# Patient Record
Sex: FEMALE | Race: WHITE | NOT HISPANIC OR LATINO | Employment: UNEMPLOYED | URBAN - METROPOLITAN AREA
[De-identification: names, ages, dates, MRNs, and addresses within clinical notes are randomized per-mention and may not be internally consistent; named-entity substitution may affect disease eponyms.]

---

## 2017-01-05 ENCOUNTER — GENERIC CONVERSION - ENCOUNTER (OUTPATIENT)
Dept: OTHER | Facility: OTHER | Age: 67
End: 2017-01-05

## 2017-02-06 ENCOUNTER — GENERIC CONVERSION - ENCOUNTER (OUTPATIENT)
Dept: OTHER | Facility: OTHER | Age: 67
End: 2017-02-06

## 2017-03-01 ENCOUNTER — ALLSCRIPTS OFFICE VISIT (OUTPATIENT)
Dept: OTHER | Facility: OTHER | Age: 67
End: 2017-03-01

## 2017-06-07 ENCOUNTER — GENERIC CONVERSION - ENCOUNTER (OUTPATIENT)
Dept: OTHER | Facility: OTHER | Age: 67
End: 2017-06-07

## 2017-06-08 ENCOUNTER — GENERIC CONVERSION - ENCOUNTER (OUTPATIENT)
Dept: OTHER | Facility: OTHER | Age: 67
End: 2017-06-08

## 2017-06-20 ENCOUNTER — GENERIC CONVERSION - ENCOUNTER (OUTPATIENT)
Dept: OTHER | Facility: OTHER | Age: 67
End: 2017-06-20

## 2018-01-12 NOTE — PROGRESS NOTES
Chief Complaint  Patient came in today for a blood pressure check  148/82      Active Problems    1  Abnormal weight loss (783 21) (R63 4)   2  Atrial fibrillation (427 31) (I48 91)   3  Biceps tendonitis on left (726 12) (M75 22)   4  Hematuria, microscopic (599 72) (R31 29)   5  Hyperlipidemia (272 4) (E78 5)   6  Hypertension (401 9) (I10)   7  Irritable bowel syndrome (564 1) (K58 9)   8  Medicare annual wellness visit, subsequent (V70 0) (Z00 00)   9  Osteopenia (733 90) (M85 80)   10  Osteoporosis (733 00) (M81 0)   11  Ostium secundum type atrial septal defect (745 5) (Q21 1)   12  Postmenopausal status (V49 81) (Z78 0)   13  Tremor (781 0) (R25 1)   14  Visit for pelvic exam (V72 31) (Z01 419)    Current Meds   1  Biotin 5 MG Oral Tablet; Take 1 tablet daily; Therapy: (Recorded:20Oct2016) to Recorded   2  Calcium TABS; Take 1 tablet daily; Therapy: (Susie Banco) to Recorded   3  CoQ10 CAPS; TAKE 1 CAPSULE DAILY WITH A MEAL; Therapy: (Susie Banco) to Recorded   4  Eliquis 5 MG Oral Tablet; Take 1 tablet twice daily; Therapy: 94FEU5355 to Recorded   5  Fish Oil CAPS; take 1 capsule daily; Therapy: (Recorded:20Oct2016) to Recorded   6  Multi-Vitamin Daily Oral Tablet; TAKE 1 TABLET DAILY; Therapy: (Susie Banco) to Recorded   7  Vitamin D 1000 UNIT Oral Tablet; TAKE 1 TABLET DAILY; Therapy: [id number] to Recorded    Allergies    1  Lisinopril TABS    2  Dust   3   No Known Food Allergies    Signatures   Electronically signed by : MAGDIEL Rod ; Nov 30 2016 12:50PM EST                       (Author)

## 2018-01-14 VITALS
WEIGHT: 122.19 LBS | BODY MASS INDEX: 22.48 KG/M2 | HEART RATE: 71 BPM | DIASTOLIC BLOOD PRESSURE: 70 MMHG | HEIGHT: 62 IN | SYSTOLIC BLOOD PRESSURE: 134 MMHG

## 2018-01-14 NOTE — PROGRESS NOTES
Assessment    1  Acute sinusitis (461 9) (J01 90)    Plan  Acute sinusitis    · Amoxicillin 500 MG Oral Capsule; TAKE 1 CAPSULE TWICE DAILY UNTIL GONE    Discussion/Summary    Finesse Tejada was seen and examined in the office today  We discussed that her symptoms may be secondary to an acute sinusitis, though not typical for bacterial sinusitis  She reports this is typical for her symptoms  She was given prescribed Amoxicillin to begin  We discussed that if symptoms persist, other conditions including silent reflux should be considered  The patient was counseled regarding instructions for management  The treatment plan was reviewed with the patient/guardian  The patient/guardian understands and agrees with the treatment plan      Chief Complaint  Burning in throat, sinus pressure      History of Present Illness  HPI: Finesse Tejada comes to the office today for a sick visit  She reports first noticing symptoms first at the sinus symptoms at the end of the December and seemed to persist  Symptoms include a drainage, thick which she coughs out, sinus pressure, without headache, fever, night sweats, or other related complaints  She reports noticing a burning in the back of her throat without any dysphagia  No other related complaints  Review of Systems    Constitutional: no fever, not feeling poorly, no chills and not feeling tired  ENT: sore throat and nasal discharge, but no earache, no nosebleeds and no hoarseness  Cardiovascular: no chest pain  Respiratory: no cough and no shortness of breath during exertion  Gastrointestinal: no abdominal pain, no nausea, no vomiting, no constipation and no diarrhea  ROS reviewed  Active Problems    1  Abnormal weight loss (783 21) (R63 4)   2  Atrial fibrillation (427 31) (I48 91)   3  Encounter for screening mammogram for malignant neoplasm of breast (V76 12)   (Z12 31)   4  Flu vaccine need (V04 81) (Z23)   5  History of self breast exam   6   Hypertension (401 9) (I10)   7  Left knee pain (719 46) (M25 562)   8  Osteopenia (733 90) (M85 80)   9  Osteoporosis (733 00) (M81 0)   10  Postmenopausal status (V49 81) (Z78 0)   11  Right knee pain (719 46) (M25 561)   12  Tremor (781 0) (R25 1)   13  Visit for pelvic exam (V72 31) (Z01 419)    Social History    · Denied: History of Alcohol Use (History)   · Caffeine Use   · Denied: History of Drug Use   · Former smoker (V15 82) (H67 491)   · Marital History - Currently    · [address] ([address]   · Three children   · Uses Safety Equipment - Seatbelts    Current Meds   1  Biotin TABS; Therapy: (Recorded:13Nov2014) to Recorded   2  Calcium TABS; Therapy: (0472 51 11 42) to Recorded   3  CoQ10 CAPS; Therapy: (Recorded:13Nov2014) to Recorded   4  Diltiazem HCl ER Coated Beads 120 MG Oral Capsule Extended Release 24 Hour; take   1 capsule once daily; Therapy: 32PXK8676 to (Gustavo Mayes)  Requested for: 38PHK3339; Last   Rx:05Jun2015 Ordered   5  Eliquis TABS; Therapy: 23WRM9663 to Recorded   6  Fish Oil CAPS; Therapy: (Recorded:13Nov2014) to Recorded   7  Multi-Vitamin Daily Oral Tablet; Therapy: (Recorded:07Nov2013) to Recorded    Allergies    1  Lisinopril TABS    2  Dust   3  No Known Food Allergies    Vitals   Recorded: 14AYV6694 01:00PM   Temperature 98 F   Heart Rate 45   Systolic 682   Diastolic 80   Height 5 ft 2 5 in   Weight 117 lb 8 0 oz   BMI Calculated 21 15   BSA Calculated 1 54   O2 Saturation 95     Physical Exam    Constitutional   General appearance: No acute distress, well appearing and well nourished  Ears, Nose, Mouth, and Throat   External inspection of ears and nose: Normal     Otoscopic examination: Tympanic membranes translucent with normal light reflex  Canals patent without erythema  Nasal mucosa, septum, and turbinates: Abnormal   There was clear rhinorrhea from both nares     Oropharynx: Normal with no erythema, edema, exudate or lesions  Pulmonary   Respiratory effort: No increased work of breathing or signs of respiratory distress  Auscultation of lungs: Clear to auscultation  Cardiovascular   Auscultation of heart: Normal rate and rhythm, normal S1 and S2, without murmurs  Lymphatic   Palpation of lymph nodes in neck: Abnormal   bilateral submandibular node enlargement  Musculoskeletal   Gait and station: Normal     Psychiatric   Orientation to person, place, and time: Normal     Mood and affect: Normal          Future Appointments    Date/Time Provider Specialty Site   07/12/2016 11:00 AM MAGDIEL Henderson   Internal Medicine [facility]     Signatures   Electronically signed by : Jumana Martin DO; Jan 18 2016  1:23PM EST                       (Author)

## 2018-01-17 NOTE — MISCELLANEOUS
Message   Recorded as Task   Date: 11/20/2016 09:55 PM, Created By: Robby Steiner   Task Name: Go to Result   Assigned To: Robby Steiner   Regarding Patient: Benjamín Liriano, Status: In Progress   Comment:    Robby Steiner - 20 Nov 2016 9:55 PM     TASK CREATED  HEr DEXA is stable at the lumbar spine and total hip, slightly worse at the femoral neck, continue Ca, vit D and weight bearing exercise, recommend checking CMP, Vit D3, intact PTH   Whitley Alva - 21 Nov 2016 8:50 AM     TASK IN PROGRESS   ArtieWhitley - 21 Nov 2016 9:34 AM     TASK REPLIED TO: Previously Assigned To [facility]  script for bloodwork sent to pt        Active Problems    1  Abnormal weight loss (783 21) (R63 4)   2  Atrial fibrillation (427 31) (I48 91)   3  Biceps tendonitis on left (726 12) (M75 22)   4  Hematuria, microscopic (599 72) (R31 29)   5  Hyperlipidemia (272 4) (E78 5)   6  Hypertension (401 9) (I10)   7  Irritable bowel syndrome (564 1) (K58 9)   8  Medicare annual wellness visit, subsequent (V70 0) (Z00 00)   9  Osteopenia (733 90) (M85 80)   10  Osteoporosis (733 00) (M81 0)   11  Ostium secundum type atrial septal defect (745 5) (Q21 1)   12  Postmenopausal status (V49 81) (Z78 0)   13  Tremor (781 0) (R25 1)   14  Visit for pelvic exam (V72 31) (Z01 419)    Current Meds   1  Biotin 5 MG Oral Tablet; Take 1 tablet daily; Therapy: (Recorded:20Oct2016) to Recorded   2  Calcium TABS; Take 1 tablet daily; Therapy: (Cyndra Lesch) to Recorded   3  CoQ10 CAPS; TAKE 1 CAPSULE DAILY WITH A MEAL; Therapy: (Cyndra Lesch) to Recorded   4  DiltiaZEM HCl ER Coated Beads 120 MG Oral Capsule Extended Release 24 Hour; take   1 capsule once daily; Therapy: 22NBG2245 to (Evaluate:97Zto9780)  Requested for: 09NCE1459 Recorded   5  Eliquis 5 MG Oral Tablet; Take 1 tablet twice daily; Therapy: 91VPH0942 to Recorded   6  Fish Oil CAPS; take 1 capsule daily; Therapy: (Cyndra Lesch) to Recorded   7  Multi-Vitamin Daily Oral Tablet; TAKE 1 TABLET DAILY; Therapy: (Recorded:20Oct2016) to Recorded   8  Vitamin D 1000 UNIT Oral Tablet; TAKE 1 TABLET DAILY; Therapy: 16PEB8415 to Recorded    Allergies    1  Lisinopril TABS    2  Dust   3  No Known Food Allergies    Plan  Osteoporosis    · (1) COMPREHENSIVE METABOLIC PANEL; Status:Active - Retrospective Authorization; Requested for:21Nov2016;    · (1) VITAMIN D PANEL, INCLUDES D25, D2, D3; Status:Active - Retrospective  Authorization; Requested for:21Nov2016;    · (Q) PTH, INTACT AND CALCIUM; Status:Active - Retrospective Authorization;  Requested  for:21Nov2016;     Signatures   Electronically signed by : Renae Avery, ; Nov 21 2016  9:34AM EST                       (Author)

## 2018-01-17 NOTE — MISCELLANEOUS
Message   Recorded as Task   Date: 01/03/2017 10:40 PM, Created By: Siobhan Sheets   Task Name: Go to Result   Assigned To: Prudy Duty   Regarding Patient: Morena Mills, Status: In Progress   Comment:    Siobhan Sheets - 03 Jan 2017 10:40 PM     TASK CREATED  normal vit D and parathyroid labs   Whitley Alva - 04 Jan 2017 4:03 PM     TASK IN PROGRESS   Pt informed  Active Problems    1  Abnormal weight loss (783 21) (R63 4)   2  Atrial fibrillation (427 31) (I48 91)   3  Biceps tendonitis on left (726 12) (M75 22)   4  Environmental allergies (V15 09) (Z91 09)   5  Hematuria, microscopic (599 72) (R31 29)   6  Hyperlipidemia (272 4) (E78 5)   7  Hypertension (401 9) (I10)   8  Irritable bowel syndrome (564 1) (K58 9)   9  Medicare annual wellness visit, subsequent (V70 0) (Z00 00)   10  Osteopenia (733 90) (M85 80)   11  Osteoporosis (733 00) (M81 0)   12  Ostium secundum type atrial septal defect (745 5) (Q21 1)   13  Postmenopausal status (V49 81) (Z78 0)   14  Status post atrial septal defect repair (V45 89) (Z98 890,Z87 74)   15  Tremor (781 0) (R25 1)   16  Visit for pelvic exam (V72 31) (Z01 419)    Current Meds   1  Biotin 5 MG Oral Tablet; Take 1 tablet daily; Therapy: (Recorded:20Oct2016) to Recorded   2  Calcium TABS; Take 1 tablet daily; Therapy: (865 5764) to Recorded   3  CoQ10 CAPS; TAKE 1 CAPSULE DAILY WITH A MEAL; Therapy: (934 5764) to Recorded   4  DiltiaZEM HCl ER Coated Beads 120 MG Oral Capsule Extended Release 24 Hour; take   1 capsule by mouth once daily; Therapy: 33CSW5642 to ([phone])  Requested for: 13AJF7591; Last   Rx:30Nov2016 Ordered   5  Eliquis 5 MG Oral Tablet; Take 1 tablet twice daily; Therapy: 28EWT8170 to Recorded   6  Fish Oil CAPS; take 1 capsule daily; Therapy: (879 0614) to Recorded   7  Flecainide Acetate 100 MG Oral Tablet; Take 2 tablets at the onset of AFib episodes;    Therapy: 11GFV9967 to (Tristian Cornejo)  Requested for: 49YFM1597; Last   SP:03ZSA7697 Ordered   8  Multi-Vitamin Daily Oral Tablet; TAKE 1 TABLET DAILY; Therapy: (Recorded:20Oct2016) to Recorded   9  Vitamin D 1000 UNIT Oral Tablet; TAKE 1 TABLET DAILY; Therapy: 41ZLS0752 to Recorded    Allergies    1  Lisinopril TABS    2  Dust   3   No Known Food Allergies    Signatures   Electronically signed by : Juice De La Cruz, ; Jan 5 2017  8:54AM EST                       (Author)